# Patient Record
Sex: MALE
[De-identification: names, ages, dates, MRNs, and addresses within clinical notes are randomized per-mention and may not be internally consistent; named-entity substitution may affect disease eponyms.]

---

## 2019-07-28 ENCOUNTER — HOSPITAL ENCOUNTER (EMERGENCY)
Dept: HOSPITAL 97 - ER | Age: 64
Discharge: HOME | End: 2019-07-28
Payer: COMMERCIAL

## 2019-07-28 DIAGNOSIS — Z91.048: ICD-10-CM
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
## 2019-07-28 DIAGNOSIS — Z85.46: ICD-10-CM
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
## 2019-07-28 DIAGNOSIS — S82.851A: Primary | ICD-10-CM
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
## 2019-07-28 PROCEDURE — 96375 TX/PRO/DX INJ NEW DRUG ADDON: CPT

## 2019-07-28 PROCEDURE — 96374 THER/PROPH/DIAG INJ IV PUSH: CPT

## 2019-07-28 PROCEDURE — 99284 EMERGENCY DEPT VISIT MOD MDM: CPT

## 2019-07-28 PROCEDURE — 2W3QX1Z IMMOBILIZATION OF RIGHT LOWER LEG USING SPLINT: ICD-10-PCS

## 2019-07-28 PROCEDURE — 96361 HYDRATE IV INFUSION ADD-ON: CPT
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
## 2019-07-28 NOTE — RAD REPORT
EXAM DESCRIPTION:  RAD - Tib Fib Right - 7/28/2019 9:01 am

 

CLINICAL HISTORY:  Leg pain following trauma

 

COMPARISON:  None.

 

FINDINGS:  Fractures involving the distal tibia and fibula are detailed in the ankle report. Remainde
r of the tibia and fibula show no additional fracture or acute bone finding. No joint effusion seen a
t the knee. No foreign body in the soft tissues.

 

 

IMPRESSION:  Distal tibia and fibula fracture changes are detailed in the ankle report. Remainder of 
the right lower leg shows no additional acute finding.

## 2019-07-28 NOTE — RAD REPORT
EXAM DESCRIPTION:  RAD - Ankle Right 3 View - 7/28/2019 9:01 am

 

CLINICAL HISTORY:  Leg and ankle pain following trauma

 

COMPARISON:  None.

 

FINDINGS:  Comminuted fracture of the ankle is present. There is an oblique fracture through the ante
rior margin of the tibial plafond extending from medial to lateral. A separate medial malleolus fract
ure is present extending into the posterior malleolus. There is 2 mm of distraction of this fracture 
fragment. Oblique fracture through the distal fibula is present 2 cm above the level of the tibial pl
afond. There is slight medial displacement of the dome of the talus relative to the tibia. The medial
 malleolus fracture fragment maintains positioning to the talar dome.

 

Hardware is in place from prior fusion of the talus and calcaneus. No hardware fracture. Soft tissue 
swelling is minimal. No foreign body seen.

 

IMPRESSION:  Distal right tibia and fibula fractures as detailed.

## 2019-07-28 NOTE — EDPHYS
Physician Documentation                                                                           

 [facility]                                                                 

Name: Nick Navarrete                                                                           

Age: 63 yrs                                                                                       

Sex: Male                                                                                         

: 1955                                                                                   

MRN: [id number]                                                                                   

Arrival Date: 2019                                                                          

Time: 08:05                                                                                       

Account#: [id number]                                                                            

Bed 7                                                                                             

Private MD:                                                                                       

ED Physician Sb Quijano                                                                      

HPI:                                                                                              

                                                                                             

08:08 This 63 yrs old  Male presents to ER via Un[facility] with complaints of Ankle   gerardo 

      Injury.                                                                                     

08:08 The patient presents with decreased range of motion, a deformity, an injury. The        gerardo 

      complaints affect the right ankle. Onset: The symptoms/episode began/occurred just          

      prior to arrival. Context: The problem was sustained outdoors. Associated signs and         

      symptoms: The patient has no apparent associated signs or symptoms. Modifying factors:      

      The symptoms are alleviated by elevation of extremity, ice packs, the symptoms are          

      aggravated by weight bearing, movement. Severity of symptoms: At their worst the            

      symptoms were severe, in the emergency department the symptoms are unchanged. The           

      patient has experienced a previous episode, many years ago.                                 

                                                                                                  

Historical:                                                                                       

- Allergies:                                                                                      

08:05 Iodine;                                                                                 aa5 

- PMHx:                                                                                           

08:05 Prostate Cancer;                                                                        aa5 

- PSHx:                                                                                           

08:05 R ankle with screws; Prostate removed;                                                  aa5 

                                                                                                  

- Immunization history:: Adult Immunizations up to date.                                          

- Social history:: Smoking status: Patient/guardian denies using tobacco.                         

- Family history:: not pertinent.                                                                 

- Ebola Screening: : No symptoms or risks identified at this time.                                

                                                                                                  

                                                                                                  

ROS:                                                                                              

08:08 Constitutional: Negative for fever, chills, and weight loss, Eyes: Negative for injury, gerardo 

      pain, redness, and discharge, ENT: Negative for injury, pain, and discharge, Neck:          

      Negative for injury, pain, and swelling, Cardiovascular: Negative for chest pain,           

      palpitations, and edema, Respiratory: Negative for shortness of breath, cough,              

      wheezing, and pleuritic chest pain, Abdomen/GI: Negative for abdominal pain, nausea,        

      vomiting, diarrhea, and constipation, Back: Negative for injury and pain, : Negative      

      for injury, bleeding, discharge, and swelling, Skin: Negative for injury, rash, and         

      discoloration, Neuro: Negative for headache, weakness, numbness, tingling, and seizure,     

      Psych: Negative for depression, anxiety, suicide ideation, homicidal ideation, and          

      hallucinations, Allergy/Immunology: Negative for hives, rash, and allergies, Endocrine:     

      Negative for neck swelling, polydipsia, polyuria, polyphagia, and marked weight             

      changes, Hematologic/Lymphatic: Negative for swollen nodes, abnormal bleeding, and          

      unusual bruising.                                                                           

08:08 MS/extremity: Positive for injury or acute deformity, decreased range of motion, pain,      

      swelling, tenderness, of the right ankle, right Achilles and anterior aspect of right       

      ankle.                                                                                      

                                                                                                  

Exam:                                                                                             

08:08 Constitutional:  This is a well developed, well nourished patient who is awake, alert,  gerardo 

      and in no acute distress. Head/Face:  Normocephalic, atraumatic. Eyes:  Pupils equal        

      round and reactive to light, extra-ocular motions intact.  Lids and lashes normal.          

      Conjunctiva and sclera are non-icteric and not injected.  Cornea within normal limits.      

      Periorbital areas with no swelling, redness, or edema. ENT:  Nares patent. No nasal         

      discharge, no septal abnormalities noted.  Tympanic membranes are normal and external       

      auditory canals are clear.  Oropharynx with no redness, swelling, or masses, exudates,      

      or evidence of obstruction, uvula midline.  Mucous membranes moist. Neck:  Trachea          

      midline, no thyromegaly or masses palpated, and no cervical lymphadenopathy.  Supple,       

      full range of motion without nuchal rigidity, or vertebral point tenderness.  No            

      Meningismus. Chest/axilla:  Normal chest wall appearance and motion.  Nontender with no     

      deformity.  No lesions are appreciated. Cardiovascular:  Regular rate and rhythm with a     

      normal S1 and S2.  No gallops, murmurs, or rubs.  Normal PMI, no JVD.  No pulse             

      deficits. Respiratory:  Lungs have equal breath sounds bilaterally, clear to                

      auscultation and percussion.  No rales, rhonchi or wheezes noted.  No increased work of     

      breathing, no retractions or nasal flaring. Abdomen/GI:  Soft, non-tender, with normal      

      bowel sounds.  No distension or tympany.  No guarding or rebound.  No evidence of           

      tenderness throughout. Back:  No spinal tenderness.  No costovertebral tenderness.          

      Full range of motion. Male :  Normal genitalia with no discharge or lesions. Skin:        

      Warm, dry with normal turgor.  Normal color with no rashes, no lesions, and no evidence     

      of cellulitis. Neuro:  Awake and alert, GCS 15, oriented to person, place, time, and        

      situation.  Cranial nerves II-XII grossly intact.  Motor strength 5/5 in all                

      extremities.  Sensory grossly intact.  Cerebellar exam normal.  Normal gait. Psych:         

      Awake, alert, with orientation to person, place and time.  Behavior, mood, and affect       

      are within normal limits.                                                                   

08:08 Musculoskeletal/extremity: Extremities: noted in the right ankle, right Achilles and        

      anterior aspect of right ankle: decreased ROM, pain, swelling, tenderness.                  

                                                                                                  

Vital Signs:                                                                                      

08:06  / 79; Pulse 68; Resp 18 S; Temp 97.9(O); Pulse Ox 100% on R/A; Weight 97.98 kg   aa5 

      (R); Height 5 ft. 9 in. (175.26 cm) (R); Pain 10/10;                                        

08:50  / 77; Pulse 66; Resp 14 S; Pulse Ox 86% on R/A;                                  aa5 

08:51 Pulse Ox 97% on 2 lpm NC;                                                               aa5 

09:55  / 93; Pulse 93; Resp 14 S; Pulse Ox 98% on R/A;                                  aa5 

10:20 Resp 16 S; Pulse Ox 97% on R/A;                                                         aa5 

10:30  / 67; Pulse 90; Resp 16 S; Pulse Ox 98% on R/A; Pain 6/10;                       aa5 

08:06 Body Mass Index 31.90 (97.98 kg, 175.26 cm)                                             aa 

                                                                                                  

MDM:                                                                                              

08:06 Patient medically screened.                                                             [facility] 

08:08 Data reviewed: vital signs, nurses notes, radiologic studies, plain films.              [facility] 

                                                                                                  

                                                                                             

08:08 Order name: Ankle Right 3 View XRAY                                                     [facility] 

                                                                                             

08:08 Order name: Tib Fib Right XRAY                                                          [facility] 

                                                                                             

08:08 Order name: Ice pack; Complete Time: 08:16                                              [facility] 

                                                                                             

08:51 Order name: Posterior Orthoglass Ankle Splint: with stirrup; Complete Time: 10:21       [facility] 

                                                                                             

08:51 Order name: Crutches; Complete Time: 10:21                                              [facility] 

                                                                                                  

Administered Medications:                                                                         

08:18 Drug: Zofran 4 mg Route: IVP; Site: right antecubital;                                  ss  

08:40 Follow up: Response: No adverse reaction                                                [facility] 

08:20 Drug: NS 0.9% 500 ml Route: IV; Rate: bolus; Site: right antecubital;                   ss  

08:48 Follow up: IV Status: Completed infusion; IV Intake: 500ml                              aa5 

08:20 Drug: morphine 4 mg Route: IVP; Site: right antecubital;                                ss  

08:40 Follow up: Response: No adverse reaction; Pain is unchanged, physician notified         aa5 

08:41 Drug: morphine 4 mg Route: IVP; Site: right antecubital;                                aa5 

08:48 Follow up: Response: No adverse reaction; Pain is decreased                             aa5 

08:41 Drug: TORadol 30 mg Route: IVP; Site: right antecubital;                                aa5 

08:48 Follow up: Response: No adverse reaction; Pain is decreased                             aa5 

08:49 Drug: NS 0.9% 1000 ml Route: IV; Rate: 125 ml/hr; Site: right antecubital;              aa5 

10:40 Follow up: IV Status: Order to discontinue infusion                                     aa5 

09:55 Drug: Dilaudid 0.5 mg Route: IVP; Site: right antecubital;                              aa5 

10:15 Follow up: Response: No adverse reaction                                                aa5 

                                                                                                  

                                                                                                  

Disposition:                                                                                      

19 08:52 Discharged to Home. Impression: Trimalleolar fracture of lower leg.                

- Condition is Stable.                                                                            

- Discharge Instructions: Ankle Fracture, Displaced Bimalleolar Ankle Fracture Treated            

  With ORIF, Displaced Bimalleolar Ankle Fracture Treated With ORIF, Care After, Ankle            

  Fracture, Easy-to-Read.                                                                         

- Prescriptions for Ibuprofen 600 mg Oral Tablet - take 1 tablet by ORAL route every 6            

  hours As needed take with food; 30 tablet. Tylenol- Codeine #3 300-30 mg Oral Tablet            

  - take 2 tablet by ORAL route every 6 hours As needed; 30 tablet.                               

- Medication Reconciliation Form, Thank You Letter, Antibiotic Education, Prescription            

  Opioid Use form.                                                                                

- Follow up: Private Physician; When: 2 - 3 days; Reason: Recheck today's complaints,             

  Continuance of care, Re-evaluation by your physician. Follow up: Cruzito Acosta MD;           

  When: 2 - 3 days; Reason: Recheck today's complaints, Re-evaluation by your physician.          

- Problem is new.                                                                                 

- Symptoms have improved.                                                                         

                                                                                                  

                                                                                                  

                                                                                                  

Signatures:                                                                                       

Dispatcher [employer]                           EDSb Martin MD MD cha Calderon, Audri, RN                     RN   aa5                                                  

Liliane Higginbotham RN                      RN   ss                                                   

                                                                                                  

Corrections: (The following items were deleted from the chart)                                    

10:46 08:52 2019 08:52 Discharged to Home. Impression: Trimalleolar fracture of lower   aa5 

      leg. Condition is Stable. Forms are Medication Reconciliation Form, Thank You Letter,       

      Antibiotic Education, Prescription Opioid Use. Follow up: Private Physician; When: 2 -      

      3 days; Reason: Recheck today's complaints, Continuance of care, Re-evaluation by your      

      physician. Follow up: Cruzito Acosta; When: 2 - 3 days; Reason: Recheck today's             

      complaints, Re-evaluation by your physician. Problem is new. Symptoms have improved. gerardo    

                                                                                                  

**************************************************************************************************